# Patient Record
Sex: FEMALE | Race: WHITE
[De-identification: names, ages, dates, MRNs, and addresses within clinical notes are randomized per-mention and may not be internally consistent; named-entity substitution may affect disease eponyms.]

---

## 2018-02-28 NOTE — MRI
MRI OF RIGHT LOWER EXTREMITY, RIGHT THIGH WITH AND WITHOUT CONTRAST.

 

CLINICAL HISTORY: 

Right lower extremity pain.  

 

COMPARISON: 

No prior imaging comparisons available.

 

FINDINGS: 

The regional marrow signal reveals no acute edema.  There is no intramuscular edema or evidence of lo
calized soft tissue mass.  The regional subcutaneous tissues reveal no acute inflammation.  Incidenta
l note of mild chondromalacia of the patellofemoral joint, incompletely assessed.  

 

There is no pathologic enhancement evident by post-contrast imaging series.  

 

No significant pathology within the imaged, partially visualized pelvic structures.

 

IMPRESSION: 

No acute abnormality of the right thigh.

 

POS: [facility]

## 2019-06-20 ENCOUNTER — HOSPITAL ENCOUNTER (OUTPATIENT)
Dept: HOSPITAL 92 - BICMAMMO | Age: 44
Discharge: HOME | End: 2019-06-20
Attending: FAMILY MEDICINE
Payer: MEDICARE

## 2019-06-20 DIAGNOSIS — Z80.3: ICD-10-CM

## 2019-06-20 DIAGNOSIS — Z12.31: Primary | ICD-10-CM

## 2019-06-20 PROCEDURE — 77067 SCR MAMMO BI INCL CAD: CPT

## 2019-06-20 PROCEDURE — 77063 BREAST TOMOSYNTHESIS BI: CPT

## 2019-06-20 NOTE — MMO
Bilateral MAMMO Bilat Screen [facility].

 

CLINICAL HISTORY:

Patient is 43 years old and is seen for screening. The patient has the following

family history of breast cancer:  cousin female, at age 45, malignant (generic).

The patient has no personal history of cancer.

 

VIEWS:

The views performed were:  bilateral craniocaudal with tomosynthesis and

bilateral mediolateral oblique with tomosynthesis.

 

FILMS COMPARED:

The present examination has been compared to a prior imaging study performed at

[facility] on 10/24/2017.

 

MAMMOGRAM FINDINGS:

The breasts are heterogeneously dense, which could obscure a lesion on

mammography.

 

There are no suspicious masses, suspicious calcifications, or new areas of

architectural distortion.

 

IMPRESSION:

THERE IS NO MAMMOGRAPHIC EVIDENCE OF MALIGNANCY.

 

A ROUTINE FOLLOW-UP MAMMOGRAM IN 1 YEAR IS RECOMMENDED.

 

THE RESULTS OF THIS EXAM WERE SENT TO THE PATIENT.

 

ACR BI-RADS Category 1 - Negative

 

MAMMOGRAPHY NOTE:

 1. A negative mammogram report should not delay a biopsy if a dominant of

 clinically suspicious mass is present.

 2. Approximately 10% to 15% of breast cancers are not detected by

 mammography.

 3. Adenosis and dense breasts may obscure an underlying neoplasm.

## 2019-09-27 ENCOUNTER — HOSPITAL ENCOUNTER (OUTPATIENT)
Dept: HOSPITAL 92 - BICMRI | Age: 44
Discharge: HOME | End: 2019-09-27
Attending: NURSE PRACTITIONER
Payer: MEDICARE

## 2019-09-27 DIAGNOSIS — M51.24: ICD-10-CM

## 2019-09-27 DIAGNOSIS — M54.6: Primary | ICD-10-CM

## 2019-09-27 PROCEDURE — 72146 MRI CHEST SPINE W/O DYE: CPT
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
## 2020-02-04 NOTE — ULT
THYROID ULTRASOUND:



HISTORY: 

Enlarged thyroid gland.



COMPARISON:

None.



FINDINGS:

Thyroid isthmus measures 0.3 cm.



Right thyroid lobe measures 5.5 x 1.5 x 2.2 cm.



Left thyroid lobe measures 5.8 x 1.4 x 1.6 cm.



Thyroid nodules:

Right thyroid lobe: No nodules.



Left thyroid lobe: 0.3 x 0.2 x 0.2 cm cyst in the lower pole. 0.2 x 0.2 x 0.3 cm solid nodule in the 
upper pole.



IMPRESSION:

Solid nodule in the upper pole of the left thyroid lobe. BI-RADS level TR 3; mildly suspicious. Follo
w-up imaging in one year.



Transcribed Date/Time: 2/4/2020 11:05 AM



Reported By: Mac Ferguson 

Electronically Signed:  2/4/2020 1:47 PM

## 2022-07-20 ENCOUNTER — HOSPITAL ENCOUNTER (OUTPATIENT)
Dept: HOSPITAL 92 - BICMAMMO | Age: 47
Discharge: HOME | End: 2022-07-20
Attending: FAMILY MEDICINE
Payer: MEDICARE

## 2022-07-20 DIAGNOSIS — Z80.3: ICD-10-CM

## 2022-07-20 DIAGNOSIS — Z12.31: Primary | ICD-10-CM

## 2022-07-20 PROCEDURE — 77067 SCR MAMMO BI INCL CAD: CPT

## 2022-07-20 PROCEDURE — 77063 BREAST TOMOSYNTHESIS BI: CPT

## 2022-10-18 ENCOUNTER — HOSPITAL ENCOUNTER (OUTPATIENT)
Dept: HOSPITAL 92 - SLEEPLAB | Age: 47
Discharge: HOME | End: 2022-10-18
Attending: FAMILY MEDICINE
Payer: MEDICARE

## 2022-10-18 DIAGNOSIS — F31.9: ICD-10-CM

## 2022-10-18 DIAGNOSIS — G47.00: ICD-10-CM

## 2022-10-18 DIAGNOSIS — G47.9: Primary | ICD-10-CM

## 2022-10-18 DIAGNOSIS — R53.83: ICD-10-CM

## 2022-10-18 DIAGNOSIS — F41.9: ICD-10-CM

## 2022-10-18 DIAGNOSIS — E66.9: ICD-10-CM

## 2022-10-18 DIAGNOSIS — G47.10: ICD-10-CM

## 2022-10-18 PROCEDURE — 95810 POLYSOM 6/> YRS 4/> PARAM: CPT

## 2023-10-18 ENCOUNTER — HOSPITAL ENCOUNTER (OUTPATIENT)
Dept: HOSPITAL 92 - BICMAMMO | Age: 48
Discharge: HOME | End: 2023-10-18
Attending: NURSE PRACTITIONER
Payer: MEDICARE

## 2023-10-18 DIAGNOSIS — Z12.31: Primary | ICD-10-CM

## 2023-10-18 DIAGNOSIS — Z80.3: ICD-10-CM

## 2023-10-18 PROCEDURE — 77067 SCR MAMMO BI INCL CAD: CPT

## 2023-10-18 PROCEDURE — 77063 BREAST TOMOSYNTHESIS BI: CPT

## 2024-12-10 ENCOUNTER — HOSPITAL ENCOUNTER (OUTPATIENT)
Dept: HOSPITAL 92 - BICMRI | Age: 49
Discharge: HOME | End: 2024-12-10
Attending: NURSE PRACTITIONER
Payer: MEDICARE

## 2024-12-10 DIAGNOSIS — R42: ICD-10-CM

## 2024-12-10 DIAGNOSIS — F31.62: Primary | ICD-10-CM

## 2024-12-10 PROCEDURE — 70553 MRI BRAIN STEM W/O & W/DYE: CPT

## 2025-01-15 ENCOUNTER — HOSPITAL ENCOUNTER (OUTPATIENT)
Dept: HOSPITAL 92 - CSHMAMMO | Age: 50
Discharge: HOME | End: 2025-01-15
Attending: NURSE PRACTITIONER
Payer: MEDICARE

## 2025-01-15 DIAGNOSIS — Z80.3: ICD-10-CM

## 2025-01-15 DIAGNOSIS — Z12.31: Primary | ICD-10-CM

## 2025-01-15 PROCEDURE — 77067 SCR MAMMO BI INCL CAD: CPT

## 2025-01-15 PROCEDURE — 77063 BREAST TOMOSYNTHESIS BI: CPT
